# Patient Record
Sex: FEMALE | Race: WHITE | NOT HISPANIC OR LATINO | URBAN - METROPOLITAN AREA
[De-identification: names, ages, dates, MRNs, and addresses within clinical notes are randomized per-mention and may not be internally consistent; named-entity substitution may affect disease eponyms.]

---

## 2024-02-21 ENCOUNTER — APPOINTMENT (OUTPATIENT)
Dept: OBSTETRICS AND GYNECOLOGY | Facility: CLINIC | Age: 44
End: 2024-02-21
Payer: COMMERCIAL

## 2024-02-21 NOTE — PROGRESS NOTES
"Anaid Stevens is a 43 y.o. here for menopause consult from No primary care provider on file.     HPI:          GynHx:  Menses: menopause at age [age]    Social History     Substance and Sexual Activity   Sexual Activity Not on file     STIs: {Diagnoses; std:74518::\"none\"}  Social History:  Exercise:   Past med hx and past surg hx reviewed and notable for: annual fistula?   Past Medical History:   Diagnosis Date    Personal history of other specified conditions 08/19/2017    History of dysuria        Objective   There were no vitals taken for this visit.    OBGyn Exam     Assessment and Plan:  ***  Problem List Items Addressed This Visit    None     No orders of the defined types were placed in this encounter.       "

## 2024-06-13 ENCOUNTER — APPOINTMENT (OUTPATIENT)
Dept: OBSTETRICS AND GYNECOLOGY | Facility: CLINIC | Age: 44
End: 2024-06-13
Payer: COMMERCIAL

## 2024-06-27 ENCOUNTER — APPOINTMENT (OUTPATIENT)
Dept: OBSTETRICS AND GYNECOLOGY | Facility: CLINIC | Age: 44
End: 2024-06-27
Payer: COMMERCIAL

## 2024-06-27 VITALS
WEIGHT: 147.2 LBS | DIASTOLIC BLOOD PRESSURE: 70 MMHG | BODY MASS INDEX: 20.61 KG/M2 | HEIGHT: 71 IN | SYSTOLIC BLOOD PRESSURE: 120 MMHG

## 2024-06-27 DIAGNOSIS — G90.A POTS (POSTURAL ORTHOSTATIC TACHYCARDIA SYNDROME): ICD-10-CM

## 2024-06-27 DIAGNOSIS — N95.1 PERIMENOPAUSE: Primary | ICD-10-CM

## 2024-06-27 PROBLEM — N81.6 RECTOCELE: Status: ACTIVE | Noted: 2024-05-13

## 2024-06-27 PROBLEM — Z20.822 SUSPECTED COVID-19 VIRUS INFECTION: Status: ACTIVE | Noted: 2021-01-13

## 2024-06-27 PROCEDURE — 1036F TOBACCO NON-USER: CPT | Performed by: OBSTETRICS & GYNECOLOGY

## 2024-06-27 PROCEDURE — 99204 OFFICE O/P NEW MOD 45 MIN: CPT | Performed by: OBSTETRICS & GYNECOLOGY

## 2024-06-27 RX ORDER — TOBRAMYCIN AND DEXAMETHASONE 3; 1 MG/ML; MG/ML
1 SUSPENSION/ DROPS OPHTHALMIC
COMMUNITY
Start: 2024-06-19

## 2024-06-27 RX ORDER — SPIRONOLACTONE 25 MG/1
25 TABLET ORAL
COMMUNITY
Start: 2024-04-24

## 2024-06-27 RX ORDER — DEXTROAMPHETAMINE SACCHARATE, AMPHETAMINE ASPARTATE, DEXTROAMPHETAMINE SULFATE AND AMPHETAMINE SULFATE 2.5; 2.5; 2.5; 2.5 MG/1; MG/1; MG/1; MG/1
10 TABLET ORAL
COMMUNITY

## 2024-06-27 ASSESSMENT — ENCOUNTER SYMPTOMS
GASTROINTESTINAL NEGATIVE: 0
PSYCHIATRIC NEGATIVE: 0
HEMATOLOGIC/LYMPHATIC NEGATIVE: 0
CONSTITUTIONAL NEGATIVE: 0
CARDIOVASCULAR NEGATIVE: 0
NEUROLOGICAL NEGATIVE: 0
ALLERGIC/IMMUNOLOGIC NEGATIVE: 0
EYES NEGATIVE: 0
MUSCULOSKELETAL NEGATIVE: 0
RESPIRATORY NEGATIVE: 0
ENDOCRINE NEGATIVE: 0

## 2024-06-27 ASSESSMENT — PAIN SCALES - GENERAL: PAINLEVEL: 0-NO PAIN

## 2024-06-27 NOTE — ASSESSMENT & PLAN NOTE
Patient has autonomic dysfunction and has been diagnosed with POTS in the past. Her trouble achieving orgasm may be related to this condition. Advised patient to continue pelvic floor PT with [facility] and gave referral to PT here at .

## 2024-06-27 NOTE — ASSESSMENT & PLAN NOTE
Discussed combined hormone replacement therapy in the setting of gabe-menopausal symptoms as well as other options to keep her hormonal status steady like OCP/ LNG-IUD with estrogen patch. She did not feel well on OCPs in the past so will not do that option.  We discussed trying estrogen patch- however not sure it would help as she is creating estrogen with her periods and unsure if her joint pain would improve. Patient is open to trying hormones when she is closer to menopause, but we discussed that it may not address her muscle aches. Since her periods are still regular, she will need combined estrogen/progestin therapy. She plans to hold off for now and see how her menopausal symptoms develop.

## 2024-06-27 NOTE — PROGRESS NOTES
Anaid Stevens is a 43 y.o. here for menopause visit and establish care.    HPI:   She feels like she has been experiencing symptoms, unsure if these are gabe-menopausal or if they are other. She is having cystic acne, hot flashes, night sweats, trouble sleeping, irritability and joint pains. She takes spironolactone for the acne which does not help much. She recently had a posterior repair for rectocele. Her periods have been shorter and heavier, but she is bleeding every month except for the last cycle which was 2 weeks late after her surgery. She has made some changes in her life eliminating wine and her sleep has improved as has the night sweats.  The sweats are usually the week prior to her period and are not as regular after this change.  She is also having difficulty orgasming and has a low sex drive. She is not having any vaginal dryness.    She feels like the left side of her vagina went numb since August 2022 after a sudden nerve impingement feeling within her body. She also has chronic back issues which has led to nerve dysfunction in her legs. She has been doing pelvic floor PT to help with her overall pelvic floor muscle dysfunction.    She has previously been worked up for autoimmune conditions and had positive VIRAL. Her dad has ulcerative colitis so she has had colonoscopies (most recently last year) which did not show evidence of IBD. She has been diagnosed with internal hemorrhoids and IBS. She did have an anal fistula repair last June.     She was also diagnosed with long COVID in March 2020 and has not felt the same since and she still has a lot of autonomic dysfunction. Was diagnosed with POTS as well at the time. Has had very little energy and lots of muscle aches and joint pains.    Her most bothersome symptoms right now are the cystic acne, trouble orgasming, and muscle aches.    GynHx: gabe-menopausal  Past med hx and past surg hx reviewed and notable for: rectocele repair, fistula repair,  "long COVID, POTS, IBS     Past Medical History:   Diagnosis Date    Personal history of other specified conditions 08/19/2017    History of dysuria      Social History     Substance and Sexual Activity   Sexual Activity Not on file         Objective   /70 (BP Location: Right arm)   Ht 1.803 m (5' 11\")   Wt 66.8 kg (147 lb 3.2 oz)   LMP 06/16/2024   BMI 20.53 kg/m²     Physical Exam  Constitutional:       Appearance: Normal appearance.   HENT:      Head: Normocephalic.   Neurological:      Mental Status: She is alert.   Psychiatric:         Mood and Affect: Mood normal.         Behavior: Behavior normal.         Thought Content: Thought content normal.         Judgment: Judgment normal.          Assessment and Plan:  Problem List Items Addressed This Visit          Ob-Gyn Problems    Perimenopause - Primary    Current Assessment & Plan     Discussed combined hormone replacement therapy in the setting of gabe-menopausal symptoms as well as other options to keep her hormonal status steady like OCP/ LNG-IUD with estrogen patch. She did not feel well on OCPs in the past so will not do that option.  We discussed trying estrogen patch- however not sure it would help as she is creating estrogen with her periods and unsure if her joint pain would improve. Patient is open to trying hormones when she is closer to menopause, but we discussed that it may not address her muscle aches. Since her periods are still regular, she will need combined estrogen/progestin therapy. She plans to hold off for now and see how her menopausal symptoms develop.             Other    POTS (postural orthostatic tachycardia syndrome)    Current Assessment & Plan     Patient has autonomic dysfunction and has been diagnosed with POTS in the past. Her trouble achieving orgasm may be related to this condition. Advised patient to continue pelvic floor PT with [facility] and gave referral to PT here at .            No orders of the defined types were " placed in this encounter.     Return to clinic as needed.      Sheela Romero MD, PGY-1

## 2024-09-11 ENCOUNTER — APPOINTMENT (OUTPATIENT)
Dept: OBSTETRICS AND GYNECOLOGY | Facility: CLINIC | Age: 44
End: 2024-09-11
Payer: COMMERCIAL

## 2025-01-09 ENCOUNTER — TELEPHONE (OUTPATIENT)
Dept: OBSTETRICS AND GYNECOLOGY | Facility: CLINIC | Age: 45
End: 2025-01-09
Payer: COMMERCIAL

## 2025-01-09 DIAGNOSIS — N92.4 EXCESSIVE BLEEDING IN PREMENOPAUSAL PERIOD: ICD-10-CM

## 2025-01-09 RX ORDER — TRANEXAMIC ACID 650 MG/1
1300 TABLET ORAL 3 TIMES DAILY
Qty: 30 TABLET | Refills: 0 | Status: SHIPPED | OUTPATIENT
Start: 2025-01-09 | End: 2025-01-14

## 2025-01-09 RX ORDER — NORETHINDRONE 5 MG/1
5 TABLET ORAL 2 TIMES DAILY
Qty: 20 TABLET | Refills: 0 | Status: SHIPPED | OUTPATIENT
Start: 2025-01-09 | End: 2025-01-19

## 2025-01-09 NOTE — TELEPHONE ENCOUNTER
Contacted pt  Name and  verified  Spoke with pt states last month she stated a irregular bleed throughout the month for the first time. She is experiencing a heavier than normal bleed at this time today, this morning she emptied her menstrual cup before leaving out to drive, she felt a heavy bleed start that filled the cup again, her over night pad was soaked, the seat cushion, in her pants and down her legs had blood on them and there were large clots present as well. She is still currently bleeding heavily. We talked about the bleeding precautions and when to seek ER care patient aware if she starts to feel lightheaded, dizzy, headache sob to be evaluated at the ER. Pt is currently headed to [place] for an appointment and wont be back in [facility] until later today. An appointment was made to follow up on this bleeding episode for 2025 with Kia.  would like pt to start TXA for 5 days, 2 tablets 3 times a day once completed to start aygestin for 10 days bid. Cbc to be drawn also.  Will call pt to see if she would like medication sent to [facility] in [facility] that she is in or  at her home pharmacy.

## 2025-01-09 NOTE — PROGRESS NOTES
Contacted pt  Name and  verified  Spoke with pt states last month she stated a irregular bleed throughout the month for the first time. She is experiencing a heavier than normal bleed at this time today, this morning she emptied her menstrual cup before leaving out to drive, she felt a heavy bleed start that filled the cup again, her over night pad was soaked, the seat cushion, in her pants and down her legs had blood on them and there were large clots present as well. She is still currently bleeding heavily. We talked about the bleeding precautions and when to seek ER care patient aware if she starts to feel lightheaded, dizzy, headache sob to be evaluated at the ER. Pt is currently headed to [place] for an appointment and wont be back in [facility] until later today. An appointment was made to follow up on this bleeding episode for 2025 with Kia.  would like pt to start TXA for 5 days, 2 tablets 3 times a day once completed to start aygestin for 10 days bid. Cbc to be drawn also.  Will call pt to see if she would like medication sent to [facility] in [facility] that she is in or  at her home pharmacy.

## 2025-01-28 NOTE — PROGRESS NOTES
"Pt here for gabe danisha sx and heavy bleeding during her menstruals. Pt states she has been passing big clots to where she bled through her menstrual cup, a overnight pad and her clothes.     Chaperone declined: Clara Pastrana, CM3      Anaid Stevens is a 44 y.o. here for gabe-menopause and heavy vaginal bleeding.    HPI: She just had an US in 2022.  Surgery was May- rectocyle repair- June period delayed. Bleeding started week of Nov- seemed like a normal period- then there was a pause 2-3 days then re-started then 3 more weeks up until Dec 20.  Then in Jan 8 bleeding started, Jan 9 she had  had the mother of all periods lasted until Jan 11.  Bleed through a menstrual cup, overnight pad, and clothes onto her car.    GynHx: gabe-menopausal  Past med hx and past surg hx reviewed and notable for: rectocele repair, fistula repair, long COVID, POTS, IBS  Social: - .     Past Medical History:   Diagnosis Date    Personal history of other specified conditions 08/19/2017    History of dysuria      Social History     Substance and Sexual Activity   Sexual Activity Yes    Partners: Male         Objective   /68   Ht 1.803 m (5' 11\")   Wt 65.8 kg (145 lb)   LMP 01/08/2025   BMI 20.22 kg/m²     Physical Exam  Constitutional:       Appearance: Normal appearance.   HENT:      Head: Normocephalic.   Neurological:      Mental Status: She is alert.   Psychiatric:         Mood and Affect: Mood normal.         Behavior: Behavior normal.         Thought Content: Thought content normal.         Judgment: Judgment normal.          Assessment and Plan:  AUB  -TVUS- blood work  -TXA if heavy bleeding happens    -will follow up in one month to review Us and think about plans    Problem List Items Addressed This Visit    None  Visit Diagnoses       Abnormal uterine bleeding (AUB)    -  Primary    Relevant Orders    Follicle Stimulating Hormone    Prolactin    TSH with reflex to Free T4 if abnormal    CBC    US PELVIS " TRANSABDOMINAL WITH TRANSVAGINAL    Iron and TIBC             Orders Placed This Encounter   Procedures    US PELVIS TRANSABDOMINAL WITH TRANSVAGINAL     Standing Status:   Future     Standing Expiration Date:   1/29/2026     Order Specific Question:   Reason for exam:     Answer:   AUB- please look for structural problems.     Order Specific Question:   Radiologist to Determine Optimal Study     Answer:   Yes     Order Specific Question:   Release result to MyChart     Answer:   Immediate [1]     Order Specific Question:   Is this exam part of a Research Study? If Yes, link this order to the research study     Answer:   No    Follicle Stimulating Hormone     Standing Status:   Future     Number of Occurrences:   1     Standing Expiration Date:   1/29/2026     Order Specific Question:   Release result to MyChart     Answer:   Immediate [1]    Prolactin     Standing Status:   Future     Number of Occurrences:   1     Standing Expiration Date:   1/29/2026     Order Specific Question:   Release result to MyChart     Answer:   Immediate [1]    TSH with reflex to Free T4 if abnormal     Standing Status:   Future     Number of Occurrences:   1     Standing Expiration Date:   1/29/2026     Order Specific Question:   Release result to MyChart     Answer:   Immediate [1]    CBC     Standing Status:   Future     Number of Occurrences:   1     Standing Expiration Date:   1/29/2026     Order Specific Question:   Release result to MyChart     Answer:   Immediate [1]    Iron and TIBC     Standing Status:   Future     Number of Occurrences:   1     Standing Expiration Date:   1/29/2026     Order Specific Question:   Release result to MyChart     Answer:   Immediate [1]

## 2025-01-29 ENCOUNTER — APPOINTMENT (OUTPATIENT)
Dept: OBSTETRICS AND GYNECOLOGY | Facility: CLINIC | Age: 45
End: 2025-01-29
Payer: COMMERCIAL

## 2025-01-29 VITALS
BODY MASS INDEX: 20.3 KG/M2 | SYSTOLIC BLOOD PRESSURE: 102 MMHG | HEIGHT: 71 IN | WEIGHT: 145 LBS | DIASTOLIC BLOOD PRESSURE: 68 MMHG

## 2025-01-29 DIAGNOSIS — N93.9 ABNORMAL UTERINE BLEEDING (AUB): Primary | ICD-10-CM

## 2025-01-29 PROCEDURE — 1036F TOBACCO NON-USER: CPT | Performed by: OBSTETRICS & GYNECOLOGY

## 2025-01-29 PROCEDURE — 99213 OFFICE O/P EST LOW 20 MIN: CPT | Performed by: OBSTETRICS & GYNECOLOGY

## 2025-01-29 PROCEDURE — 3008F BODY MASS INDEX DOCD: CPT | Performed by: OBSTETRICS & GYNECOLOGY

## 2025-01-29 RX ORDER — TRAZODONE HYDROCHLORIDE 50 MG/1
TABLET ORAL
COMMUNITY
Start: 2024-10-01

## 2025-01-29 RX ORDER — ZOLPIDEM TARTRATE 5 MG/1
TABLET ORAL
COMMUNITY
Start: 2024-10-01

## 2025-01-29 ASSESSMENT — PAIN SCALES - GENERAL: PAINLEVEL_OUTOF10: 0-NO PAIN

## 2025-01-30 ENCOUNTER — HOSPITAL ENCOUNTER (OUTPATIENT)
Dept: RADIOLOGY | Facility: CLINIC | Age: 45
Discharge: HOME | End: 2025-01-30
Payer: COMMERCIAL

## 2025-01-30 DIAGNOSIS — N93.9 ABNORMAL UTERINE BLEEDING (AUB): ICD-10-CM

## 2025-01-30 LAB
ERYTHROCYTE [DISTWIDTH] IN BLOOD BY AUTOMATED COUNT: 12.9 % (ref 11–15)
FSH SERPL-ACNC: 4.6 MIU/ML
HCT VFR BLD AUTO: 40.5 % (ref 35–45)
HGB BLD-MCNC: 13.5 G/DL (ref 11.7–15.5)
IRON SATN MFR SERPL: 26 % (CALC) (ref 16–45)
IRON SERPL-MCNC: 108 MCG/DL (ref 40–190)
MCH RBC QN AUTO: 29.8 PG (ref 27–33)
MCHC RBC AUTO-ENTMCNC: 33.3 G/DL (ref 32–36)
MCV RBC AUTO: 89.4 FL (ref 80–100)
PLATELET # BLD AUTO: 289 THOUSAND/UL (ref 140–400)
PMV BLD REES-ECKER: 10.4 FL (ref 7.5–12.5)
PROLACTIN SERPL-MCNC: 13.2 NG/ML
RBC # BLD AUTO: 4.53 MILLION/UL (ref 3.8–5.1)
TIBC SERPL-MCNC: 419 MCG/DL (CALC) (ref 250–450)
TSH SERPL-ACNC: 0.72 MIU/L
WBC # BLD AUTO: 7.1 THOUSAND/UL (ref 3.8–10.8)

## 2025-01-30 PROCEDURE — 76856 US EXAM PELVIC COMPLETE: CPT

## 2025-02-11 ENCOUNTER — TELEPHONE (OUTPATIENT)
Dept: OBSTETRICS AND GYNECOLOGY | Facility: CLINIC | Age: 45
End: 2025-02-11
Payer: COMMERCIAL

## 2025-02-11 DIAGNOSIS — Z01.89 NORMAL HYSTEROSCOPY: Primary | ICD-10-CM

## 2025-02-11 RX ORDER — MISOPROSTOL 200 UG/1
200 TABLET ORAL ONCE
Qty: 1 TABLET | Refills: 0 | Status: SHIPPED | OUTPATIENT
Start: 2025-02-11 | End: 2025-02-11

## 2025-02-11 RX ORDER — IBUPROFEN 800 MG/1
800 TABLET ORAL EVERY 8 HOURS PRN
Qty: 30 TABLET | Refills: 0 | Status: SHIPPED | OUTPATIENT
Start: 2025-02-11 | End: 2025-02-21

## 2025-02-11 RX ORDER — ALPRAZOLAM 1 MG/1
1 TABLET ORAL ONCE
Qty: 1 TABLET | Refills: 0 | Status: SHIPPED | OUTPATIENT
Start: 2025-02-11 | End: 2025-02-11

## 2025-02-12 ENCOUNTER — APPOINTMENT (OUTPATIENT)
Dept: OBSTETRICS AND GYNECOLOGY | Facility: CLINIC | Age: 45
End: 2025-02-12
Payer: COMMERCIAL

## 2025-03-05 ENCOUNTER — APPOINTMENT (OUTPATIENT)
Dept: OBSTETRICS AND GYNECOLOGY | Facility: CLINIC | Age: 45
End: 2025-03-05
Payer: COMMERCIAL

## 2025-03-11 ENCOUNTER — TELEPHONE (OUTPATIENT)
Dept: OBSTETRICS AND GYNECOLOGY | Facility: CLINIC | Age: 45
End: 2025-03-11
Payer: COMMERCIAL

## 2025-03-11 NOTE — TELEPHONE ENCOUNTER
Follow up call made to pt to confirm she cancelled I/O hysteroscopy with Dr. Adam tomorrow. Pt states Yes she cancelled. [place] and will call once she is ready to reschedule.

## 2025-03-12 ENCOUNTER — APPOINTMENT (OUTPATIENT)
Dept: OBSTETRICS AND GYNECOLOGY | Facility: CLINIC | Age: 45
End: 2025-03-12
Payer: COMMERCIAL

## 2025-04-30 ENCOUNTER — APPOINTMENT (OUTPATIENT)
Dept: OBSTETRICS AND GYNECOLOGY | Facility: CLINIC | Age: 45
End: 2025-04-30
Payer: COMMERCIAL

## 2025-04-30 VITALS
BODY MASS INDEX: 20.72 KG/M2 | HEIGHT: 71 IN | WEIGHT: 148 LBS | SYSTOLIC BLOOD PRESSURE: 102 MMHG | DIASTOLIC BLOOD PRESSURE: 64 MMHG

## 2025-04-30 DIAGNOSIS — N93.9 ABNORMAL UTERINE BLEEDING (AUB): Primary | ICD-10-CM

## 2025-04-30 PROCEDURE — 99214 OFFICE O/P EST MOD 30 MIN: CPT | Performed by: OBSTETRICS & GYNECOLOGY

## 2025-04-30 PROCEDURE — 1036F TOBACCO NON-USER: CPT | Performed by: OBSTETRICS & GYNECOLOGY

## 2025-04-30 PROCEDURE — 3008F BODY MASS INDEX DOCD: CPT | Performed by: OBSTETRICS & GYNECOLOGY

## 2025-04-30 RX ORDER — LORAZEPAM 0.5 MG/1
TABLET ORAL
COMMUNITY
Start: 2025-03-12

## 2025-04-30 ASSESSMENT — PAIN SCALES - GENERAL: PAINLEVEL_OUTOF10: 0-NO PAIN

## 2025-04-30 NOTE — PROGRESS NOTES
"Pt here for follow up on heavy bleeding. Pt had hysteroscopy done 2.20.25 @     Chaperone declined: Clara Pastrana, CM3      Anaid Stevens is a 44 y.o. here for gabe-menopause and heavy vaginal bleeding.    HPI: She is having irregular heavy bleeding had a hysteroscopy scheduled and she canceled- she had the hysteroscopy at the [facility].  US showed heterogenous endometrium.   History of Present Illness  Anaid Stevens is a 44 year old female who presents for follow-up regarding abnormal uterine bleeding and recent hysteroscopy results.    She underwent a hysteroscopy at an external clinic, which showed no polyps or fibroids, but noted a thickening of the uterine lining. Biopsy results were normal. She has a history of abnormal uterine bleeding, with her last period being lighter than usual, but the current one is heavy, though not as severe as in January. In January, she experienced weeks of spotting in December leading to heavy bleeding. She experiences significant night sweats, which she associates with perimenopausal symptoms.    She uses tranexamic acid for heavy bleeding, finding it somewhat effective but not entirely satisfactory. She experienced anxiety while waiting for the biopsy results.         GynHx: gabe-menopausal  Past med hx and past surg hx reviewed and notable for: rectocele repair, fistula repair, long COVID, POTS, IBS  Social: - .     Past Medical History:   Diagnosis Date    Personal history of other specified conditions 08/19/2017    History of dysuria      Social History     Substance and Sexual Activity   Sexual Activity Yes    Partners: Male         Objective   /64   Ht 1.803 m (5' 11\")   Wt 67.1 kg (148 lb)   LMP 04/26/2025   BMI 20.64 kg/m²     Physical Exam  Constitutional:       Appearance: Normal appearance.   HENT:      Head: Normocephalic.   Neurological:      Mental Status: She is alert.   Psychiatric:         Mood and Affect: Mood normal.       "   Behavior: Behavior normal.         Thought Content: Thought content normal.         Judgment: Judgment normal.        Assessment and Plan:  AUB- perimenopause with some vasomotor symptoms  Assessment & Plan  Abnormal uterine bleeding  Hysteroscopy showed normal endometrial lining, biopsy negative. Bleeding improved but still heavy. Discussed hormonal and surgical options. She chose watchful waiting.  - Continue watchful waiting and monitor symptoms.  - Use tranexamic acid as needed for heavy bleeding.  - Discussed OCP, progestin IUD, HT estrogen dose with oral progestin as options if bleeding becomes intolerable.  Also discussed ablation but that endometrium not accessible after.  - Follow-up in August to reassess.    Perimenopausal symptoms  Experiencing night sweats and low estrogen symptoms. At 44, entering perimenopause. Discussed estrogen therapy with progesterone to prevent hyperplasia. She chose to monitor symptoms.  - Monitor symptoms, including night sweats.  - Consider estrogen therapy if symptoms worsen.  - Track menstrual cycle and symptoms.         -will follow up in one month to review Us and think about plans    Problem List Items Addressed This Visit    None         No orders of the defined types were placed in this encounter.

## 2025-08-27 ENCOUNTER — APPOINTMENT (OUTPATIENT)
Dept: OBSTETRICS AND GYNECOLOGY | Facility: CLINIC | Age: 45
End: 2025-08-27
Payer: COMMERCIAL

## 2025-08-27 VITALS
BODY MASS INDEX: 21 KG/M2 | WEIGHT: 150 LBS | SYSTOLIC BLOOD PRESSURE: 114 MMHG | DIASTOLIC BLOOD PRESSURE: 60 MMHG | HEIGHT: 71 IN

## 2025-08-27 DIAGNOSIS — F32.81 PMDD (PREMENSTRUAL DYSPHORIC DISORDER): Primary | ICD-10-CM

## 2025-08-27 DIAGNOSIS — N95.1 PERIMENOPAUSE: ICD-10-CM

## 2025-08-27 PROCEDURE — 99214 OFFICE O/P EST MOD 30 MIN: CPT | Performed by: OBSTETRICS & GYNECOLOGY

## 2025-08-27 PROCEDURE — 3008F BODY MASS INDEX DOCD: CPT | Performed by: OBSTETRICS & GYNECOLOGY

## 2025-08-27 PROCEDURE — 1036F TOBACCO NON-USER: CPT | Performed by: OBSTETRICS & GYNECOLOGY

## 2025-08-27 RX ORDER — SPIRONOLACTONE 25 MG/1
TABLET ORAL
COMMUNITY
Start: 2025-08-13

## 2025-08-27 RX ORDER — NORETHINDRONE 5 MG/1
TABLET ORAL
Qty: 36 TABLET | Refills: 3 | Status: SHIPPED | OUTPATIENT
Start: 2025-08-27

## 2025-08-27 RX ORDER — ESTRADIOL 0.07 MG/D
1 FILM, EXTENDED RELEASE TRANSDERMAL 2 TIMES WEEKLY
Qty: 24 PATCH | Refills: 3 | Status: SHIPPED | OUTPATIENT
Start: 2025-08-28 | End: 2026-08-28

## 2025-08-27 ASSESSMENT — PAIN SCALES - GENERAL: PAINLEVEL_OUTOF10: 0-NO PAIN

## 2025-11-19 ENCOUNTER — APPOINTMENT (OUTPATIENT)
Dept: OBSTETRICS AND GYNECOLOGY | Facility: CLINIC | Age: 45
End: 2025-11-19
Payer: COMMERCIAL